# Patient Record
Sex: MALE | Race: WHITE | URBAN - NONMETROPOLITAN AREA
[De-identification: names, ages, dates, MRNs, and addresses within clinical notes are randomized per-mention and may not be internally consistent; named-entity substitution may affect disease eponyms.]

---

## 2021-06-16 ENCOUNTER — OFFICE VISIT (OUTPATIENT)
Dept: PRIMARY CARE CLINIC | Age: 46
End: 2021-06-16

## 2021-06-16 VITALS
RESPIRATION RATE: 18 BRPM | HEIGHT: 72 IN | DIASTOLIC BLOOD PRESSURE: 78 MMHG | BODY MASS INDEX: 33.86 KG/M2 | WEIGHT: 250 LBS | SYSTOLIC BLOOD PRESSURE: 118 MMHG | HEART RATE: 75 BPM | OXYGEN SATURATION: 98 %

## 2021-06-16 DIAGNOSIS — M79.641 PAIN OF RIGHT HAND: Primary | ICD-10-CM

## 2021-06-16 DIAGNOSIS — M79.642 HAND PAIN, LEFT: ICD-10-CM

## 2021-06-16 RX ORDER — METFORMIN HYDROCHLORIDE 500 MG/1
TABLET, EXTENDED RELEASE ORAL
COMMUNITY
Start: 2021-03-26

## 2021-06-16 RX ORDER — IBUPROFEN 800 MG/1
800 TABLET ORAL
Qty: 270 TABLET | Refills: 1 | Status: SHIPPED | OUTPATIENT
Start: 2021-06-16

## 2021-06-16 ASSESSMENT — ENCOUNTER SYMPTOMS: RESPIRATORY NEGATIVE: 1

## 2021-06-16 NOTE — PATIENT INSTRUCTIONS
Patient Education      With your symptoms today you are showing some signs of what could be carpal tunnel. Treatment is aimed at reduction in offending activity, reducing inflammation and conservative management. I have included some exercises and recommended some splints to use to take pressure off the median nerve in the wrist  Follow up in 4 weeks after shut down    Carpal Tunnel Syndrome: Exercises  Introduction  Here are some examples of exercises for you to try. The exercises may be suggested for a condition or for rehabilitation. Start each exercise slowly. Ease off the exercises if you start to have pain. You will be told when to start these exercises and which ones will work best for you. Warm-up stretches  When you no longer have pain or numbness, you can do exercises to help prevent carpal tunnel syndrome from coming back. Do not do any stretch or movement that is uncomfortable or painful. 1. Rotate your wrist up, down, and from side to side. Repeat 4 times. 2. Stretch your fingers far apart. Relax them, and then stretch them again. Repeat 4 times. 3. Stretch your thumb by pulling it back gently, holding it, and then releasing it. Repeat 4 times. How to do the exercises  Prayer stretch   1. Start with your palms together in front of your chest just below your chin. 2. Slowly lower your hands toward your waistline, keeping your hands close to your stomach and your palms together until you feel a mild to moderate stretch under your forearms. 3. Hold for at least 15 to 30 seconds. Repeat 2 to 4 times. Wrist flexor stretch   1. Extend your arm in front of you with your palm up. 2. Bend your wrist, pointing your hand toward the floor. 3. With your other hand, gently bend your wrist farther until you feel a mild to moderate stretch in your forearm. 4. Hold for at least 15 to 30 seconds. Repeat 2 to 4 times. Wrist extensor stretch   1.  Repeat steps 1 through 4 of the stretch above, but begin

## 2021-06-16 NOTE — PROGRESS NOTES
Jose Manuel  [facility] [address] [facility]  Dept: [phone]  Loc: [address] [facility] (:  1975) is a 55 y.o. [facility] patient, here for evaluation of the following chief complaint(s):  Hand Pain (bilateral hand pain, has been going on 3-4 weeks )      ASSESSMENT/PLAN:  1. Pain of right hand  -     ibuprofen (ADVIL;MOTRIN) 800 MG tablet; Take 1 tablet by mouth 3 times daily (with meals), Disp-270 tablet, R-1Normal  2. Hand pain, left  -     ibuprofen (ADVIL;MOTRIN) 800 MG tablet; Take 1 tablet by mouth 3 times daily (with meals), Disp-270 tablet, R-1Normal      Conservative treatment to reduce inflammation  Follow up 4 weeks    When he stopped down a few days ago to schedule his appointment he was given padded ergo gloves to reduce impact of the hands and palm surfaces. He was advised to continue to wear these untilhis appointment today, and to continue after his appointment today. Call was also placed to his Supervisor to discuss his symptoms and exposure that is possibly flaring a pre existing condition. Spent time discussing my findings of a positive Tinel's and Phalen's with a normal neck, shoulder and arm examination. The reported tingling and shooting sensation was exacerbated with  Wrist flexion, extension (worse) and with radial and ulnar deviation. His  strength was tested and documented      I informed him that with his diabetes that there is also a risk of neuropathy and that it is imperative he continue to follow with is PCP for DM 2 management. I also discussed with him that I suspect possible pre existing condition with is previous work exposure and confirmation of an occasional symptom and family history of carpal tunnel tht this may have been pre existing.  He agreed that with the relatively abrupt onset and worsening of symptoms in the last 6 weeks since starting at [employer]; that he has flared a possible prior existing chronic condition with this new job and with the repetitive wrist motion. I informed him how to file a [address] and Report his symptoms if he chose as a BWC claim as this was his right to do so. I also explained to him that when he presented to schedule the appointment a few days ago  that the purpose of me seeing him medically today was to evaluate his symptoms with his relatively short course of employment since April and suspicion of possible work hardening, muscle strain or other conditions such as tendonitis that can be caused from new employment strain with physical labor that we see with new hired associates in this clinic. I explained that his visit today was to help better review prior history and to determine his symptoms and possible diagnosis. He states that he did not at this time want to purse as a BWC claim and that he wanted to keep this a a medical option for treatment. I gave him a handout from [facility] for Carpal Tunnel explaining the diagnosis, treatment, causes etc. I informed him that I ws not diagnosing this but felt his symptoms represented the symptoms of carpal tunnel and that the definitive testing is nerve conduction and EMG. He is agreeable to pursue 4 weeks of conservative tx with gloves, splints, NSAID and follow back up to see after shut down if his symptoms improve or worsen. At that time we can defer and order additional treatment, testing or referral if needed      Splints to help with relieving pressure on the nerves of the wrist  Padded gloves for work to pad the hands      Left hand : 68 psi  Right hand : 30/48 psi  He also gave consent for me to discuss his symptoms with his supervisor Penny Lane. Message was left with her to call me back to discuss  No restrictions were given today  He ws agreeable to POC and verbalized understanding of care.        Return in about 4 weeks (around 7/14/2021). SUBJECTIVE/OBJECTIVE:  Geetha Jimenez is a new [employer] Employee hired in April who is coming in for complaints of hand numbness tingling, aching at night  He reports that this has intensified in the past month and starting to affect his sleep, he ws training the first few weeks and has intensified with symptoms being present daily and worsening. Reports hand stiffness and swelling aching along with report of shooting tingling sensations and  numbness into his first 2 fingers on both hands. He is left handed  But ambidextrous and reports that he drills with is right and his right is worse than left. 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 He reports he can drill in excess of 100 x per unit. 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 Also report recent dx as a diabetic, he reports his BG ws > 400 and hs been started on oral mediction for BG control  He reports BG are now running 140's daily and he denies any previous diabetic neuropathy. He reports the pain is worst with wrist movement and is a tingling sensation shooting up to the elbow  Denies any previous neck issues, injury or radicular symtpoms. Hand Pain   The incident occurred more than 1 week ago. Incident location: pt reports has has occasional similar symptoms prior to work, but has flared with work. The injury mechanism was repetitive motion. The pain is present in the left hand, right hand, right fingers and left fingers. The quality of the pain is described as aching and shooting. The pain does not radiate (elbows B).  The pain is at a severity of 5/10. The pain is moderate. The pain has been constant since the incident. Associated symptoms include numbness and tingling. Treatments tried: intermittent OTC ibuprofen, not daily prn tylenol. The treatment provided no relief. Review of Systems   Constitutional: Negative for chills, diaphoresis, fatigue and fever. Respiratory: Negative. Cardiovascular: Negative. Endocrine: Negative for cold intolerance and heat intolerance. Musculoskeletal: Positive for arthralgias and myalgias. Skin: Negative for rash and wound. Neurological: Positive for tingling, weakness and numbness. Negative for dizziness. Physical Exam  Constitutional:       Appearance: Normal appearance. He is not ill-appearing. Cardiovascular:      Rate and Rhythm: Normal rate. Pulses: Normal pulses. Pulmonary:      Effort: Pulmonary effort is normal.   Musculoskeletal:         General: Tenderness present. No deformity or signs of injury. Skin:     General: Skin is warm and dry. Capillary Refill: Capillary refill takes less than 2 seconds. Coloration: Skin is not pale. Findings: No bruising, erythema, lesion or rash. Neurological:      Mental Status: He is alert and oriented to person, place, and time. Sensory: Sensory deficit present. Motor: Weakness present. No tremor. Deep Tendon Reflexes: Reflexes are normal and symmetric. Comments: Report paresthesia with index and middle fingers B hands  + Phalens and tinel Bilaterally  Reports tingling with wrist extension and ROM planes described below  Despite the sensory sensations the ROM was normal Bilarterally    B Hands PROM and AROM: normal:  Performed  wrist Flexion, extension, radial,  Ulnar Deviation and pronation and supination of bilateral hands  Hand  strength w also tested.                   Additional Information:    /78   Pulse 75   Resp 18   Ht 5' 11.5\" (1.816 m)   Wt 250 lb (113.4 kg)   SpO2 98% BMI 34.38 kg/m²     An electronic signature was used to authenticate this note.     --Nathanael Ordonez, NILE - CNP